# Patient Record
Sex: FEMALE | Race: WHITE | URBAN - METROPOLITAN AREA
[De-identification: names, ages, dates, MRNs, and addresses within clinical notes are randomized per-mention and may not be internally consistent; named-entity substitution may affect disease eponyms.]

---

## 2024-05-30 ENCOUNTER — OFFICE VISIT (OUTPATIENT)
Age: 49
End: 2024-05-30

## 2024-05-30 VITALS — WEIGHT: 166 LBS | BODY MASS INDEX: 29.41 KG/M2 | HEIGHT: 63 IN

## 2024-05-30 DIAGNOSIS — M54.12 CERVICAL RADICULOPATHY: ICD-10-CM

## 2024-05-30 DIAGNOSIS — T84.9XXA COMPLICATION ASSOCIATED WITH ORTHOPEDIC DEVICE (HCC): ICD-10-CM

## 2024-05-30 DIAGNOSIS — Z98.1 ARTHRODESIS STATUS: Primary | ICD-10-CM

## 2024-05-30 DIAGNOSIS — S12.9XXA PSEUDOARTHROSIS OF CERVICAL SPINE, INITIAL ENCOUNTER (HCC): ICD-10-CM

## 2024-05-30 RX ORDER — METHOCARBAMOL 750 MG/1
750 TABLET, FILM COATED ORAL 3 TIMES DAILY PRN
COMMUNITY

## 2024-05-30 RX ORDER — ALBUTEROL SULFATE 90 UG/1
AEROSOL, METERED RESPIRATORY (INHALATION)
COMMUNITY

## 2024-05-30 RX ORDER — ESTRADIOL/NORETHINDRONE ACETATE TRANSDERMAL SYSTEM .05; .14 MG/D; MG/D
PATCH, EXTENDED RELEASE TRANSDERMAL
COMMUNITY
Start: 2024-05-03

## 2024-05-30 RX ORDER — IBUPROFEN 800 MG/1
800 TABLET ORAL EVERY 6 HOURS PRN
COMMUNITY

## 2024-05-30 RX ORDER — FLUTICASONE PROPIONATE AND SALMETEROL 250; 50 UG/1; UG/1
1 POWDER RESPIRATORY (INHALATION) 2 TIMES DAILY
COMMUNITY
Start: 2024-04-30

## 2024-05-30 RX ORDER — MONTELUKAST SODIUM 10 MG/1
10 TABLET ORAL NIGHTLY
COMMUNITY
Start: 2024-04-30

## 2024-05-30 RX ORDER — FLUCONAZOLE 100 MG/1
TABLET ORAL
COMMUNITY
Start: 2024-05-07

## 2024-05-30 RX ORDER — FLUTICASONE PROPIONATE 50 MCG
1 SPRAY, SUSPENSION (ML) NASAL DAILY
COMMUNITY

## 2024-05-30 RX ORDER — LORATADINE 10 MG/1
TABLET ORAL
COMMUNITY

## 2024-05-30 RX ORDER — ASPIRIN 81 MG/1
81 TABLET ORAL
COMMUNITY

## 2024-05-30 RX ORDER — LIDOCAINE 50 MG/G
PATCH TOPICAL
COMMUNITY
Start: 2024-05-03

## 2024-05-30 NOTE — PROGRESS NOTES
degeneration with hardware eroding into the disc space and likely C4-C7 pseudoarthrosis    I did review a CT image from her patient's cell phone that demonstrates bony clefts at each of the prior surgical levels.      MRI Cervical spine, report and images reviewed and interpreted and reveals again postoperative changes status post C4-C7 ACDF with some residual stenosis but no myelomalacia.  She does have advanced stenosis at C3-C4    Course and size of the vertebral arteries: Normal      Diagnosis:      ICD-10-CM    1. Arthrodesis status  Z98.1 XR CERVICAL SPINE FLEXION AND EXTENSION      2. Complication associated with orthopedic device ([facility])  T84.9XXA       3. Cervical radiculopathy  M54.12       4. Pseudoarthrosis of cervical spine, initial encounter ([facility])  S12.9XXA           Assessment/Plan:  This patient's clinical history and physical exam is consistent with mechanical neck pain related to both a C4-C7 pseudoarthrosis and progressive C3-C4 disc degeneration due to hardware in the disc space.  She also has cervical stenosis at C3-C4 and is getting some early radicular symptoms in the right upper extremity.  Her left-sided weakness is chronic.       At this point, her overall neurologic exam is reassuring for any myelopathy or acute radiculopathy.  The left-sided  and intrinsic weakness is chronic and not representative of her new pathology.  She does have pseudoarthrosis noted on the CT scan and radiographs but structurally she has been living with this for years and it will likely remain reasonably stable going forward.  On the other hand, the C4 screws in the disc space and stenosis at C3-C4 are likely the current pain generators and would warrant surgical intervention at any point at the patient's discretion.  I would recommend a surgery to address both the stenosis and the multiple level pseudoarthrosis     The patient would be placed in the prone position and a midline incision would be made over the